# Patient Record
Sex: FEMALE | Race: WHITE
[De-identification: names, ages, dates, MRNs, and addresses within clinical notes are randomized per-mention and may not be internally consistent; named-entity substitution may affect disease eponyms.]

---

## 2018-12-31 ENCOUNTER — HOSPITAL ENCOUNTER (OUTPATIENT)
Dept: HOSPITAL 97 - ER | Age: 73
Setting detail: OBSERVATION
LOS: 3 days | Discharge: HOME | End: 2019-01-03
Attending: HOSPITALIST | Admitting: INTERNAL MEDICINE
Payer: COMMERCIAL

## 2018-12-31 VITALS — BODY MASS INDEX: 27.4 KG/M2

## 2018-12-31 DIAGNOSIS — R77.1: ICD-10-CM

## 2018-12-31 DIAGNOSIS — Z23: ICD-10-CM

## 2018-12-31 DIAGNOSIS — J02.0: ICD-10-CM

## 2018-12-31 DIAGNOSIS — J13: Primary | ICD-10-CM

## 2018-12-31 LAB
BUN BLD-MCNC: 12 MG/DL (ref 7–18)
GLUCOSE SERPLBLD-MCNC: 113 MG/DL (ref 74–106)
HCT VFR BLD CALC: 37.6 % (ref 36–45)
LYMPHOCYTES # SPEC AUTO: 2.3 K/UL (ref 0.7–4.9)
NT-PROBNP SERPL-MCNC: 95 PG/ML (ref ?–125)
PMV BLD: 8 FL (ref 7.6–11.3)
POTASSIUM SERPL-SCNC: 3.1 MMOL/L (ref 3.5–5.1)
RBC # BLD: 4.4 M/UL (ref 3.86–4.86)

## 2018-12-31 PROCEDURE — 84165 PROTEIN E-PHORESIS SERUM: CPT

## 2018-12-31 PROCEDURE — 83605 ASSAY OF LACTIC ACID: CPT

## 2018-12-31 PROCEDURE — 80061 LIPID PANEL: CPT

## 2018-12-31 PROCEDURE — 87804 INFLUENZA ASSAY W/OPTIC: CPT

## 2018-12-31 PROCEDURE — 96365 THER/PROPH/DIAG IV INF INIT: CPT

## 2018-12-31 PROCEDURE — 81015 MICROSCOPIC EXAM OF URINE: CPT

## 2018-12-31 PROCEDURE — 84100 ASSAY OF PHOSPHORUS: CPT

## 2018-12-31 PROCEDURE — 90670 PCV13 VACCINE IM: CPT

## 2018-12-31 PROCEDURE — 80048 BASIC METABOLIC PNL TOTAL CA: CPT

## 2018-12-31 PROCEDURE — 85025 COMPLETE CBC W/AUTO DIFF WBC: CPT

## 2018-12-31 PROCEDURE — 87040 BLOOD CULTURE FOR BACTERIA: CPT

## 2018-12-31 PROCEDURE — 36415 COLL VENOUS BLD VENIPUNCTURE: CPT

## 2018-12-31 PROCEDURE — 87081 CULTURE SCREEN ONLY: CPT

## 2018-12-31 PROCEDURE — 81003 URINALYSIS AUTO W/O SCOPE: CPT

## 2018-12-31 PROCEDURE — 83880 ASSAY OF NATRIURETIC PEPTIDE: CPT

## 2018-12-31 PROCEDURE — 83735 ASSAY OF MAGNESIUM: CPT

## 2018-12-31 PROCEDURE — 80074 ACUTE HEPATITIS PANEL: CPT

## 2018-12-31 PROCEDURE — 84132 ASSAY OF SERUM POTASSIUM: CPT

## 2018-12-31 PROCEDURE — 99285 EMERGENCY DEPT VISIT HI MDM: CPT

## 2018-12-31 PROCEDURE — 71046 X-RAY EXAM CHEST 2 VIEWS: CPT

## 2018-12-31 PROCEDURE — 94760 N-INVAS EAR/PLS OXIMETRY 1: CPT

## 2018-12-31 PROCEDURE — 80053 COMPREHEN METABOLIC PANEL: CPT

## 2018-12-31 PROCEDURE — 94640 AIRWAY INHALATION TREATMENT: CPT

## 2018-12-31 RX ADMIN — PIPERACILLIN SODIUM AND TAZOBACTAM SODIUM SCH MLS: 3; .375 INJECTION, POWDER, LYOPHILIZED, FOR SOLUTION INTRAVENOUS at 23:14

## 2018-12-31 RX ADMIN — Medication SCH ML: at 23:12

## 2018-12-31 RX ADMIN — SODIUM CHLORIDE SCH MLS: 0.9 INJECTION, SOLUTION INTRAVENOUS at 23:12

## 2018-12-31 NOTE — ER
Nurse's Notes                                                                                     

 [facility]                                                                

Name: Shaan Byrnes                                                                               

Age: 73 yrs                                                                                       

Sex: Female                                                                                       

: 1945                                                                                   

MRN: [id number]                                                                                   

Arrival Date: 2018                                                                          

Time: 18:44                                                                                       

Account#: [id number]                                                                            

Bed 16                                                                                            

Private MD: Sanjay Gomez V                                                                      

Diagnosis: Lobar pneumonia, unspecified organism                                                  

                                                                                                  

Presentation:                                                                                     

                                                                                             

18:49 Presenting complaint: Patient states: cough, core throat started a day after [place], [place]  

      my ears hurting, took OTC meds PTA: reports fever;. Transition of care: patient was not     

      received from another setting of care. Onset of symptoms was 2018. Risk        

      Assessment: Do you want to hurt yourself or someone else? Patient reports no desire to      

      harm self or others. Initial Sepsis Screen: Does the patient meet any 2 criteria? Yes       

      Does the patient have a suspected source of infection? No. Patient's initial sepsis         

      screen is negative. Care prior to arrival: None.                                            

18:49 Method Of Arrival: Ambulatory                                                             

18:49 Acuity: CURTIS 4                                                                           hj  

                                                                                                  

Triage Assessment:                                                                                

18:50 General: Appears in no apparent distress. uncomfortable, Behavior is calm, cooperative, hj  

      appropriate for age. Pain: Complains of pain in throat.                                     

                                                                                                  

Historical:                                                                                       

- Allergies:                                                                                      

18:50 No Known Allergies;                                                                     hj  

- Home Meds:                                                                                      

18:50 HRT [Active]; unknown antidepressant [Active];                                          hj  

- PMHx:                                                                                           

18:50 Depression;                                                                             hj  

- PSHx:                                                                                           

18:50 Hysterectomy; Cholecystectomy;                                                          hj  

                                                                                                  

- Immunization history:: Adult Immunizations up to date.                                          

- Social history:: Smoking status: Patient/guardian denies using tobacco,                         

  Patient/guardian denies using alcohol.                                                          

- Ebola Screening: : Patient negative for fever greater than or equal to 101.5 degrees            

  Fahrenheit, and additional compatible Ebola Virus Disease symptoms Patient denies               

  exposure to infectious person Patient denies travel to an Ebola-affected area in the            

  21 days before illness onset.                                                                   

                                                                                                  

                                                                                                  

Screenin:50 Abuse screen: Denies threats or abuse. Denies injuries from another. Nutritional        hj  

      screening: No deficits noted. Tuberculosis screening: No symptoms or risk factors           

      identified. Fall Risk None identified.                                                      

                                                                                                  

Assessment:                                                                                       

18:51 Respiratory: Airway is patent Respiratory effort is even, unlabored, Respiratory        hj  

      pattern is regular, symmetrical, EENT: Throat.                                              

19:20 Reassessment: Patient appears in no apparent distress at this time. Patient and/or      cc3 

      family updated on plan of care and expected duration. Pain level reassessed. Patient is     

      alert, oriented x 3, equal unlabored respirations, skin warm/dry/pink.                      

20:26 Reassessment: Patient appears in no apparent distress at this time. Patient and/or      cc3 

      family updated on plan of care and expected duration. Pain level reassessed. Patient is     

      alert, oriented x 3, equal unlabored respirations, skin warm/dry/pink. Laboratory           

      called for critical lab result of WBC 21.3, informed Dr. Paul.                             

21:36 Reassessment: Patient appears in no apparent distress at this time. Patient and/or      cc3 

      family updated on plan of care and expected duration. Pain level reassessed. Patient is     

      alert, oriented x 3, equal unlabored respirations, skin warm/dry/pink.                      

21:50 Reassessment: Patient appears in no apparent distress at this time. Patient and/or      cc3 

      family updated on plan of care and expected duration. Pain level reassessed. Patient is     

      alert, oriented x 3, equal unlabored respirations, skin warm/dry/pink. Room available       

      in 214, called for report but was told by charge nurse Tiarra to wait for their call.          

22:10 Reassessment: Patient appears in no apparent distress at this time. Patient and/or      cc3 

      family updated on plan of care and expected duration. Pain level reassessed. Patient is     

      alert, oriented x 3, equal unlabored respirations, skin warm/dry/pink. report called to     

      TANGELA Ibarra for continuity of care.                                                           

22:54 Reassessment: Patient appears in no apparent distress at this time. Patient and/or      cc3 

      family updated on plan of care and expected duration. Pain level reassessed. Patient is     

      alert, oriented x 3, equal unlabored respirations, skin warm/dry/pink. Patient left ER      

      for admission vitally stable by wheelchair escorted by KESHAV Matos.                       

                                                                                                  

Vital Signs:                                                                                      

18:51  / 56; Pulse 97; Resp 20; Temp 100.7(O); Pulse Ox 96% on R/A; Weight 68.04 kg;    hj  

      Height 5 ft. 2 in. (157.48 cm); Pain 4/10;                                                  

20:40  / 81; Pulse 93; Resp 20 S; Pulse Ox 96% on R/A;                                  cc3 

21:27  / 70; Pulse 81; Resp 19 S; Temp 99.3(O); Pulse Ox 96% on R/A;                    cc3 

22:44  / 67; Pulse 88; Resp 20 S; Pulse Ox 95% on R/A;                                  cc3 

18:51 Body Mass Index 27.44 (68.04 kg, 157.48 cm)                                               

                                                                                                  

ED Course:                                                                                        

18:44 Patient arrived in ED.                                                                  mr  

18:45 Sanjay Gomez MD is Private Physician.                                                 mr  

18:50 Triage completed.                                                                       hj  

18:51 Arm band placed on left wrist.                                                          hj  

18:51 Patient has correct armband on for positive identification. Bed in low position. Call   hj  

      light in reach. Side rails up X 1.                                                          

19:20 Owen Paul MD is Attending Physician.                                              gs  

19:20 Taylor Long is Primary Nurse.                                                      cc3 

19:47 Patient moved to radiology via wheelchair.                                              az  

19:50 X-ray completed. Patient tolerated procedure well.                                      az  

19:54 Patient moved back from radiology.                                                      az  

19:55 XRAY Chest Pa And Lat (2 Views) In Process Unspecified.                                 EDMS

20:15 Inserted saline lock: 22 gauge in left antecubital area, using aseptic technique. Blood cc3 

      collected.                                                                                  

20:15 Initial lab(s) drawn, by me, sent to lab.                                               cc3 

21:08 Ravindra Solis MD is Hospitalizing Provider.                                           gs  

22:54 No provider procedures requiring assistance completed. Patient admitted, IV remains in  cc3 

      place.                                                                                      

                                                                                                  

Administered Medications:                                                                         

20:45 Drug: Rocephin - (cefTRIAXone) 1 grams Route: IVPB; Infused Over: 30 mins; Site: left   cc3 

      antecubital;                                                                                

21:20 Follow up: Response: No adverse reaction; IV Status: Completed infusion; IV Intake: [id number] 

20:45 Drug: Zithromax 500 mg Route: PO;                                                       cc3 

21:20 Follow up: Response: No adverse reaction                                                cc3 

                                                                                                  

                                                                                                  

Intake:                                                                                           

21:20 IV: 50ml; Total: 50ml.                                                                  cc3 

                                                                                                  

Outcome:                                                                                          

21:10 Decision to Hospitalize by Provider.                                                    gs  

22:10 Admitted to Med/surg accompanied by tech, via wheelchair, room 214, with chart, Report  cc3 

      called to  DOMINIC Ibarra                                                                     

22:10 Condition: stable                                                                           

22:10 Instructed on the need for admit, Demonstrated understanding of instructions.               

22:54 Patient left the ED.                                                                    cc3 

                                                                                                  

Signatures:                                                                                       

Dispatcher [employer]                           Anastacia SandersRey RN                      RN                                                      

Owen Paul MD MD gs Cordel, Charlene                             cc3                                                  

Laila Franklin Gabriella                                                                                 

                                                                                                  

Corrections: (The following items were deleted from the chart)                                    

18:54 18:51 Pulse 97bpm; Resp 20bpm; Pulse Ox 96% RA; Temp 100.7F Oral; 68.04 kg; Height 5    hj  

      ft. 2 in.; BMI: 27.4; Pain 4/10; hj                                                         

18:54 18:51 Pulse 97bpm; Resp 20bpm; Pulse Ox 96% RA; Temp 100.7F Oral; 68.04 kg; Height 5    hj  

      ft. 2 in.; BMI: 27.4; Pain 4/10; hj                                                         

22:25 21:50 Reassessment: Patient appears in no apparent distress at this time. Patient       cc3 

      and/or family updated on plan of care and expected duration. Pain level reassessed.         

      Patient is alert, oriented x 3, equal unlabored respirations, skin warm/dry/pink. Room      

      available in 214, called for report but was told cc3                                        

22:52 22:10 Reassessment: Patient appears in no apparent distress at this time. Patient       cc3 

      and/or family updated on plan of care and expected duration. Pain level reassessed.         

      Patient is alert, oriented x 3, equal unlabored respirations, skin warm/dry/pink.           

      report to TANGELA Ibarra 3                                                                     

                                                                                             

00:01  21:05 Inserted saline lock: 22 gauge in left antecubital area, using aseptic      cc3 

      technique. Blood collected.                                                               

                                                                                             

00: 21:05 Initial lab(s) drawn, by me, sent to lab.                                 cc3 

                                                                                                  

**************************************************************************************************

## 2018-12-31 NOTE — EDPHYS
Physician Documentation                                                                           

 [facility]                                                                

Name: Shaan Byrnes                                                                               

Age: 73 yrs                                                                                       

Sex: Female                                                                                       

: 1945                                                                                   

MRN: [id number]                                                                                   

Arrival Date: 2018                                                                          

Time: 18:44                                                                                       

Account#: [id number]                                                                            

Bed 16                                                                                            

Private MD: Sanjay Gomez V                                                                      

ED Physician Owen aPul                                                                       

HPI:                                                                                              

                                                                                             

20:54 This 73 yrs old  Female presents to ER via Ambulatory with complaints of       gs  

      Cough, Sore Throat, Decreased Appetite.                                                     

20:54 The patient or guardian reports cough.                                                  gs  

21:00 Onset: The symptoms/episode began/occurred 1 week(s) ago, and became worse and became   gs  

      persistent. Severity of symptoms: At their worst the symptoms were moderate, in the         

      emergency department the symptoms are unchanged. Modifying factors: The symptoms are        

      alleviated by nothing, the symptoms are aggravated by nothing. Associated signs and         

      symptoms: Pertinent positives: fever, sore throat, laryngitis. The patient has              

      experienced similar episodes in the past, a few times. The patient has not recently         

      seen a physician.                                                                           

                                                                                                  

Historical:                                                                                       

- Allergies:                                                                                      

18:50 No Known Allergies;                                                                     hj  

- Home Meds:                                                                                      

18:50 HRT [Active]; unknown antidepressant [Active];                                          hj  

- PMHx:                                                                                           

18:50 Depression;                                                                             hj  

- PSHx:                                                                                           

18:50 Hysterectomy; Cholecystectomy;                                                          hj  

                                                                                                  

- Immunization history:: Adult Immunizations up to date.                                          

- Social history:: Smoking status: Patient/guardian denies using tobacco,                         

  Patient/guardian denies using alcohol.                                                          

- Ebola Screening: : Patient negative for fever greater than or equal to 101.5 degrees            

  Fahrenheit, and additional compatible Ebola Virus Disease symptoms Patient denies               

  exposure to infectious person Patient denies travel to an Ebola-affected area in the            

  21 days before illness onset.                                                                   

                                                                                                  

                                                                                                  

ROS:                                                                                              

21:00 All other systems are negative.                                                         gs  

                                                                                                  

Exam:                                                                                             

21:00 Head/Face:  Normocephalic, atraumatic. Eyes:  Pupils equal round and reactive to light, gs  

      extra-ocular motions intact.  Lids and lashes normal.  Conjunctiva and sclera are           

      non-icteric and not injected.  Cornea within normal limits.  Periorbital areas with no      

      swelling, redness, or edema. Neck:  Trachea midline, no thyromegaly or masses palpated,     

      and no cervical lymphadenopathy.  Supple, full range of motion without nuchal rigidity,     

      or vertebral point tenderness.  No Meningismus. Chest/axilla:  Normal chest wall            

      appearance and motion.  Nontender with no deformity.  No lesions are appreciated.           

      Cardiovascular:  Regular rate and rhythm with a normal S1 and S2.  No gallops, murmurs,     

      or rubs.  Normal PMI, no JVD.  No pulse deficits. Abdomen/GI:  Soft, non-tender, with       

      normal bowel sounds.  No distension or tympany.  No guarding or rebound.  No evidence       

      of tenderness throughout. Back:  No spinal tenderness.  No costovertebral tenderness.       

      Full range of motion. Skin:  Warm, dry with normal turgor.  Normal color with no            

      rashes, no lesions, and no evidence of cellulitis. MS/ Extremity:  Pulses equal, no         

      cyanosis.  Neurovascular intact.  Full, normal range of motion. Neuro:  Awake and           

      alert, GCS 15, oriented to person, place, time, and situation.  Cranial nerves II-XII       

      grossly intact.  Motor strength 5/5 in all extremities.  Sensory grossly intact.            

      Cerebellar exam normal.  Normal gait.                                                       

21:00 Constitutional: The patient appears alert, awake.                                           

21:00 ENT: TM's: are normal, Posterior pharynx: erythema, that is moderate, Voice: is hoarse.     

21:00 Respiratory: mild respiratory distress is noted,  Respirations: tachypnea, that is          

      mild, Breath sounds: rhonchi, that are mild, are located in both bases.                     

                                                                                                  

Vital Signs:                                                                                      

18:51  / 56; Pulse 97; Resp 20; Temp 100.7(O); Pulse Ox 96% on R/A; Weight 68.04 kg;      

      Height 5 ft. 2 in. (157.48 cm); Pain 4/10;                                                  

20:40  / 81; Pulse 93; Resp 20 S; Pulse Ox 96% on R/A;                                  cc3 

21:27  / 70; Pulse 81; Resp 19 S; Temp 99.3(O); Pulse Ox 96% on R/A;                    cc3 

22:44  / 67; Pulse 88; Resp 20 S; Pulse Ox 95% on R/A;                                  cc3 

18:51 Body Mass Index 27.44 (68.04 kg, 157.48 cm)                                               

                                                                                                  

MDM:                                                                                              

19:32 Patient medically screened.                                                               

21:00 Differential Diagnosis: Bronchitis Influenza Viral Syndrome Pneumonia. Data reviewed:     

      vital signs, nurses notes. Response to treatment: the patient's symptoms have mildly        

      improved after treatment, and as a result, I will discharge patient.                        

                                                                                                  

                                                                                             

18:53 Order name: Flu; Complete Time: 19:32                                                   hj  

                                                                                             

18:53 Order name: Strep; Complete Time: 19:32                                                 hj  

                                                                                             

19:36 Order name: CBC with Diff; Complete Time: 20:39                                           

                                                                                             

19:36 Order name: Basic Metabolic Panel                                                         

                                                                                             

20:33 Order name: Blood Culture*                                                                

                                                                                             

20:49 Order name: CBC with Automated Diff                                                     EDMS

                                                                                             

20:49 Order name: CBC with Automated Diff                                                     EDMS

                                                                                             

20:49 Order name: Comprehensive Metabolic Panel                                               EDMS

                                                                                             

20:49 Order name: Comprehensive Metabolic Panel                                               EDMS

                                                                                             

20:49 Order name: Lactate                                                                     EDMS

                                                                                             

20:49 Order name: Lactate                                                                     EDMS

                                                                                             

20:49 Order name: Lipid Profile                                                               EDMS

                                                                                             

20:49 Order name: Lipid Profile                                                               EDMS

                                                                                             

20:49 Order name: Magnesium                                                                   EDMS

                                                                                             

19:36 Order name: XRAY Chest Pa And Lat (2 Views); Complete Time: 20:16                         

                                                                                             

20:49 Order name: Regular                                                                     EDMS

                                                                                             

20:49 Order name: Magnesium                                                                   EDMS

                                                                                             

20:49 Order name: Phosphorus                                                                  EDMS

                                                                                             

20:49 Order name: Phosphorus                                                                  EDMS

                                                                                             

20:49 Order name: NT PRO-BNP                                                                  EDMS

                                                                                             

20:49 Order name: NT PRO-BNP                                                                  EDMS

                                                                                             

21:49 Order name: Lactate                                                                     EDMS

                                                                                             

21:59 Order name: NT PRO-BNP                                                                  EDMS

                                                                                                  

Administered Medications:                                                                         

20:45 Drug: Rocephin - (cefTRIAXone) 1 grams Route: IVPB; Infused Over: 30 mins; Site: left   cc3 

      antecubital;                                                                                

21:20 Follow up: Response: No adverse reaction; IV Status: Completed infusion; IV Intake: [id number] 

20:45 Drug: Zithromax 500 mg Route: PO;                                                       cc3 

21:20 Follow up: Response: No adverse reaction                                                cc3 

                                                                                                  

                                                                                                  

Disposition:                                                                                      

18 21:10 Hospitalization ordered by Ravindra Solis for Inpatient Admission. Preliminary     

  diagnosis is Lobar pneumonia, unspecified organism.                                             

- Bed requested for Telemetry/MedSurg (Inpatient).                                                

- Status is Inpatient Admission.                                                              cc3 

- Condition is Stable.                                                                            

- Problem is new.                                                                                 

- Symptoms have improved.                                                                         

UTI on Admission? No                                                                              

                                                                                                  

                                                                                                  

                                                                                                  

Critical care time excluding procedures:                                                          

21:00 Critical care time: Bedside Care: 10 minutes, Consultation: 10 minutes, Family          gs  

      Intervention: 10 minutes. Total time: 30 minutes                                            

                                                                                                  

Signatures:                                                                                       

Dispatcher [employer]                           EDMS                                                 

Shivani Cole RN RN                                                      

Rey Aragon RN RN                                                      

Owen Paul MD MD                                                      

Taylor Long                             3                                                  

                                                                                                  

Corrections: (The following items were deleted from the chart)                                    

21:44 21:10 Hospitalization Ordered by Ravindra Solis MD for Inpatient Admission. Preliminary    

      diagnosis is Lobar pneumonia, unspecified organism. Bed requested for Telemetry/MedSurg     

      (Inpatient). Status is Inpatient Admission. Condition is Stable. Problem is new.            

      Symptoms have improved. UTI on Admission? No.                                             

22:54 21:44 2018 21:10 Hospitalization Ordered by Ravindra Solis MD for Inpatient        cc3 

      Admission. Preliminary diagnosis is Lobar pneumonia, unspecified organism. Bed              

      requested for Telemetry/MedSurg (Inpatient). Status is Inpatient Admission. Condition       

      is Stable. Problem is new. Symptoms have improved. UTI on Admission? No.                  

                                                                                                  

**************************************************************************************************

## 2018-12-31 NOTE — RAD REPORT
EXAM DESCRIPTION:  RAD - Chest Pa And Lat (2 Views) - 12/31/2018 7:55 pm

 

CLINICAL HISTORY:  Cough, sore throat

 

COMPARISON:  May 2017

 

TECHNIQUE:  PA and lateral views of the chest were obtained.

 

FINDINGS:  The lungs are normal volume. Patient has a mild baseline prominence of the lung markings. 
Focal posterior left base opacification is present suspicious for early pneumonia.   Heart size is no
rmal and central vasculature is within normal limits.  No pleural effusion or pneumothorax seen.  No 
acute bony finding noted.  No aortic abnormality.

 

IMPRESSION:  Early left lung base pneumonia.

## 2019-01-01 LAB
ALBUMIN SERPL BCP-MCNC: 2.2 G/DL (ref 3.4–5)
ALP SERPL-CCNC: 127 U/L (ref 45–117)
ALT SERPL W P-5'-P-CCNC: 51 U/L (ref 12–78)
AST SERPL W P-5'-P-CCNC: 11 U/L (ref 15–37)
BUN BLD-MCNC: 12 MG/DL (ref 7–18)
GLUCOSE SERPLBLD-MCNC: 104 MG/DL (ref 74–106)
HCT VFR BLD CALC: 32.9 % (ref 36–45)
HDLC SERPL-MCNC: 34 MG/DL (ref 40–60)
LDLC SERPL CALC-MCNC: 85 MG/DL (ref ?–130)
LYMPHOCYTES # SPEC AUTO: 3.7 K/UL (ref 0.7–4.9)
MAGNESIUM SERPL-MCNC: 1.9 MG/DL (ref 1.8–2.4)
NT-PROBNP SERPL-MCNC: 77 PG/ML (ref ?–125)
PMV BLD: 7.9 FL (ref 7.6–11.3)
POTASSIUM SERPL-SCNC: 3.5 MMOL/L (ref 3.5–5.1)
RBC # BLD: 3.86 M/UL (ref 3.86–4.86)
UA COMPLETE W REFLEX CULTURE PNL UR: (no result)
UA DIPSTICK W REFLEX MICRO PNL UR: (no result)

## 2019-01-01 RX ADMIN — SODIUM CHLORIDE SCH MLS: 0.9 INJECTION, SOLUTION INTRAVENOUS at 23:02

## 2019-01-01 RX ADMIN — ACETAMINOPHEN PRN MG: 500 TABLET, FILM COATED ORAL at 05:25

## 2019-01-01 RX ADMIN — IPRATROPIUM BROMIDE SCH MG: 0.5 SOLUTION RESPIRATORY (INHALATION) at 07:32

## 2019-01-01 RX ADMIN — IPRATROPIUM BROMIDE SCH: 0.5 SOLUTION RESPIRATORY (INHALATION) at 14:00

## 2019-01-01 RX ADMIN — PIPERACILLIN SODIUM AND TAZOBACTAM SODIUM SCH MLS: 3; .375 INJECTION, POWDER, LYOPHILIZED, FOR SOLUTION INTRAVENOUS at 05:07

## 2019-01-01 RX ADMIN — IPRATROPIUM BROMIDE SCH MG: 0.5 SOLUTION RESPIRATORY (INHALATION) at 04:40

## 2019-01-01 RX ADMIN — Medication SCH ML: at 09:04

## 2019-01-01 RX ADMIN — ENOXAPARIN SODIUM SCH MG: 40 INJECTION SUBCUTANEOUS at 09:03

## 2019-01-01 RX ADMIN — IPRATROPIUM BROMIDE SCH MG: 0.5 SOLUTION RESPIRATORY (INHALATION) at 00:00

## 2019-01-01 RX ADMIN — LOSARTAN POTASSIUM SCH MG: 50 TABLET, FILM COATED ORAL at 12:26

## 2019-01-01 RX ADMIN — IPRATROPIUM BROMIDE SCH MG: 0.5 SOLUTION RESPIRATORY (INHALATION) at 19:25

## 2019-01-01 RX ADMIN — CEFTRIAXONE SCH MLS: 1 INJECTION, SOLUTION INTRAVENOUS at 21:07

## 2019-01-01 RX ADMIN — IPRATROPIUM BROMIDE SCH MG: 0.5 SOLUTION RESPIRATORY (INHALATION) at 11:08

## 2019-01-01 RX ADMIN — LOSARTAN POTASSIUM SCH MG: 50 TABLET, FILM COATED ORAL at 12:25

## 2019-01-01 RX ADMIN — Medication SCH: at 21:00

## 2019-01-01 RX ADMIN — SODIUM CHLORIDE SCH MLS: 0.9 INJECTION, SOLUTION INTRAVENOUS at 12:26

## 2019-01-01 NOTE — P.PN
Subjective


Date of Service: 01/01/19


Chief Complaint: SICK SINCE 26TH OF DECEMBER


Subjective: Improving





MS STEVAN HAS COUGH, CONGESTION, BROWN SPUTUM, SORE THROAT. SHE HAS STREP 

THROAT POSITIVE AND L BASAL PNEUMONIA.  SHE TOOK  HER FLU VACCINE.  NO ONE IS 

SICK AROUND HER.  SHE IS FEELING SOME BETTER.





Review of Systems


10-point ROS is otherwise unremarkable


General: Weakness


ENT: Throat Pain


Respiratory: Cough





Physical Examination





- Vital Signs


Temperature: 97.2 F


Blood Pressure: 150/80


Pulse: 76


Respirations: 20


Pulse Ox (%): 96





- Physical Exam


General: Alert, In no apparent distress


HEENT: Atraumatic, PERRLA, EOMI


Neck: Supple, JVD not distended


Respiratory: Clear to auscultation bilaterally, Normal air movement


Cardiovascular: Regular rate/rhythm, Normal S1 S2


Gastrointestinal: Normal bowel sounds, No tenderness


Musculoskeletal: No tenderness


Integumentary: No rashes


Neurological: Normal speech, Normal tone, Normal affect


Lymphatics: No axilla or inguinal lymphadenopathy





- Studies


Laboratory Data (last 24 hrs)





12/31/18 20:00: Sodium 135 L, Potassium 3.1 L, BUN 12, Creatinine 1.09, Glucose 

113 H


12/31/18 20:00: WBC 21.3 H*, Hgb 12.9, Hct 37.6, Plt Count 375





Microbiology Data (last 24 hrs): 








12/31/18 18:56   Nasopharnyx   Influenza Type A Antigen Screen - Final


12/31/18 18:56   Nasopharnyx   Influenza Type B Antigen Screen - Final


12/31/18 18:56   Throat   Group A Streptococcus Rapid Screen - Final





Medications List Reviewed: Yes





Assessment And Plan





- Current Problems (Diagnosis)


(1) Streptococcus pneumoniae infection


Current Visit: Yes   Status: Acute   


Plan: 


ROCEPHIN IV. 


WILL DC ZOSYN AS SHE DOES NOT NEED PSEUDOMONAS OR ANAERBIC COVERAGE.








(2) Acute streptococcal pharyngitis


Current Visit: Yes   Status: Acute   


Plan: 


DAILY LAB. 


WATCH WBC








(3) Hyperglobulinemia


Current Visit: Yes   Status: Acute   


Plan: 


THIS CAN BE FROM INFECTION.


I WILL CHECK SPEP AND HEP C ANTIBODY.

## 2019-01-01 NOTE — P.HP
Certification for Inpatient


Patient admitted to: Observation


With expected LOS: <2 Midnights


Patient will require the following post-hospital care: None


Practitioner: I am a practitioner with admitting privileges, knowledge of 

patient current condition, hospital course, and medical plan of care.


Services: Services provided to patient in accordance with Admission 

requirements found in Title 42 Section 412.3 of the Code of Federal Regulations





Patient History


Date of Service: 12/31/18


Reason for admission: Acute strep pharyngitis


History of Present Illness: 





Patient is a 73-year-old female came into the hospital with fever, cough and a 

sore throat.  This been going on for the last week but has gotten progressively 

worse over the last couple of days.  She is seen in the emergency room, and her 

workup revealed that she had strep pharyngitis.  Her white count was 

significantly elevated, and her chest x-ray revealed a focal posterior left 

base opacification suspicious for early pneumonia.  Decision was made to admit 

the patient for IV antibiotic therapy, and if we also wanted to monitor her 

symptomatically to make sure the chest x-ray findings or something that was 

going to be progressively getting worse.  She was put in droplet precautions 

but will go ahead and discontinue this.


Allergies





No Known Drug Allergies Allergy (Verified 12/31/18 23:27)


 none





Home Medications: 








Venlafaxine HCl [Effexor*] 75 mg PO DAILY 12/31/18 








- Past Medical/Surgical History


Has patient received pneumonia vaccine in the past: No


Diabetic: No


-: depression


-: hysterectomy


-: cholecystectomy





- Family History


  ** Mother


Medical History: Other (see notes)


Notes: stroke





  ** Father


Medical History: Heart disease





- Social History


Smoking Status: Never smoker


Alcohol use: No


CD- Drugs: No


Caffeine use: Yes


Place of Residence: Home





Review of Systems


10-point ROS is otherwise unremarkable





Physical Examination





- Vital Signs


Temperature: 97.2 F


Blood Pressure: 150/80


Pulse: 76


Respirations: 20


Pulse Ox (%): 96





- Physical Exam


General: Alert, In no apparent distress, Oriented x3


HEENT: Atraumatic, Other (Erythematous mucous membrane), EOMI, Sclerae 

nonicteric


Neck: Supple, 2+ carotid pulse no bruit, No LAD, Without JVD or thyroid 

abnormality


Respiratory: Clear to auscultation bilaterally, Normal air movement


Cardiovascular: Regular rate/rhythm, Normal S1 S2, No murmurs


Gastrointestinal: Normal bowel sounds, Soft and benign, Non-distended, No 

tenderness


Musculoskeletal: No clubbing, No swelling, No tenderness


Integumentary: No rashes


Neurological: Normal gait, Normal speech, Normal strength at 5/5 x4 extr, 

Normal tone, Sensation intact, Cranial nerves 3-12 intact, Normal affect


Lymphatics: No axilla or inguinal lymphadenopathy





- Studies


Laboratory Data (last 24 hrs)





12/31/18 20:00: Sodium 135 L, Potassium 3.1 L, BUN 12, Creatinine 1.09, Glucose 

113 H


12/31/18 20:00: WBC 21.3 H*, Hgb 12.9, Hct 37.6, Plt Count 375





Microbiology Data (last 24 hrs): 








12/31/18 18:56   Nasopharnyx   Influenza Type A Antigen Screen - Final


12/31/18 18:56   Nasopharnyx   Influenza Type B Antigen Screen - Final


12/31/18 18:56   Throat   Group A Streptococcus Rapid Screen - Final








Assessment & Plan





- Problems (Diagnosis)


(1) Acute streptococcal pharyngitis


Current Visit: Yes   Status: Acute   





(2) Left lower lobe pneumonia


Current Visit: Yes   Status: Acute   





(3) Leukocytosis


Current Visit: Yes   Status: Acute   





- Plan





1. Continue with IV antibiotics; may need to add an anti-inflammatory 

medication as well


2. Awaiting blood culture


3. Repeat chest x-ray if symptoms worsen


4. Continue with nebs as needed


5. O2 per protocol


6. Continue with gentle hydration


7. Repeat labs including CBC and renal function in a.m.


8. GI and DVT prophylaxis


Discharge Plan: Home


Plan to discharge in: 48 Hours





- Advance Directives


Does patient have a Living Will: No


Does patient have a Durable POA for Healthcare: No





- Code Status/Comfort Care


Code Status Assessed: Yes


Code Status: Full Code


Critical Care: No


Time Spent Managing PTS Care (In Minutes): 45

## 2019-01-02 LAB
BUN BLD-MCNC: 12 MG/DL (ref 7–18)
BUN BLD-MCNC: 13 MG/DL (ref 7–18)
GLUCOSE SERPLBLD-MCNC: 130 MG/DL (ref 74–106)
GLUCOSE SERPLBLD-MCNC: 97 MG/DL (ref 74–106)
HCT VFR BLD CALC: 32.4 % (ref 36–45)
LYMPHOCYTES # SPEC AUTO: 2.2 K/UL (ref 0.7–4.9)
MORPHOLOGY BLD-IMP: (no result)
PMV BLD: 7.6 FL (ref 7.6–11.3)
POTASSIUM SERPL-SCNC: 3.6 MMOL/L (ref 3.5–5.1)
POTASSIUM SERPL-SCNC: 4 MMOL/L (ref 3.5–5.1)
RBC # BLD: 3.74 M/UL (ref 3.86–4.86)
STOMATOCYTES BLD QL SMEAR: (no result)

## 2019-01-02 RX ADMIN — IPRATROPIUM BROMIDE SCH MG: 0.5 SOLUTION RESPIRATORY (INHALATION) at 13:50

## 2019-01-02 RX ADMIN — Medication SCH ML: at 20:33

## 2019-01-02 RX ADMIN — IPRATROPIUM BROMIDE SCH MG: 0.5 SOLUTION RESPIRATORY (INHALATION) at 19:24

## 2019-01-02 RX ADMIN — LOSARTAN POTASSIUM SCH MG: 50 TABLET, FILM COATED ORAL at 23:44

## 2019-01-02 RX ADMIN — Medication SCH ML: at 09:17

## 2019-01-02 RX ADMIN — ENOXAPARIN SODIUM SCH MG: 40 INJECTION SUBCUTANEOUS at 09:16

## 2019-01-02 RX ADMIN — IPRATROPIUM BROMIDE SCH MG: 0.5 SOLUTION RESPIRATORY (INHALATION) at 07:43

## 2019-01-02 RX ADMIN — SODIUM CHLORIDE SCH MLS: 0.9 INJECTION, SOLUTION INTRAVENOUS at 13:10

## 2019-01-02 RX ADMIN — IPRATROPIUM BROMIDE SCH MG: 0.5 SOLUTION RESPIRATORY (INHALATION) at 01:25

## 2019-01-02 RX ADMIN — LOSARTAN POTASSIUM SCH MG: 50 TABLET, FILM COATED ORAL at 10:08

## 2019-01-02 RX ADMIN — SODIUM CHLORIDE SCH MLS: 0.9 INJECTION, SOLUTION INTRAVENOUS at 22:29

## 2019-01-02 RX ADMIN — VENLAFAXINE SCH MG: 75 TABLET ORAL at 09:16

## 2019-01-02 RX ADMIN — ACETAMINOPHEN PRN MG: 500 TABLET, FILM COATED ORAL at 20:30

## 2019-01-02 RX ADMIN — CEFTRIAXONE SCH MLS: 1 INJECTION, SOLUTION INTRAVENOUS at 20:32

## 2019-01-02 NOTE — P.PN
Subjective


Date of Service: 01/02/19


Chief Complaint: SICK SINCE 26TH OF DECEMBER


Subjective: Improving





MS STEVAN HAS COUGH, CONGESTION, BROWN SPUTUM, SORE THROAT. SHE HAS STREP 

THROAT POSITIVE AND L BASAL PNEUMONIA.  SHE TOOK  HER FLU VACCINE.  NO ONE IS 

SICK AROUND HER.  SHE IS FEELING SOME BETTER.





VERY STABLE CLINICALLY.


STILL SOME SORENESS THROAT BUT CHEST CONGESTION IS GONE. 





Review of Systems


10-point ROS is otherwise unremarkable


General: Weakness, Malaise





Physical Examination





- Vital Signs


Temperature: 98.1 F


Blood Pressure: 154/72


Pulse: 74


Respirations: 14


Pulse Ox (%): 96





- Physical Exam


General: Alert, Acute distress, Mild distress


HEENT: Atraumatic, PERRLA, EOMI


Neck: Supple, JVD not distended


Respiratory: Clear to auscultation bilaterally, Normal air movement


Cardiovascular: Regular rate/rhythm, Normal S1 S2


Gastrointestinal: Normal bowel sounds, No tenderness


Musculoskeletal: No tenderness


Integumentary: No rashes


Neurological: Normal speech, Normal tone, Normal affect


Lymphatics: No axilla or inguinal lymphadenopathy





- Studies


Medications List Reviewed: Yes





Assessment And Plan





- Current Problems (Diagnosis)


(1) Streptococcus pneumoniae infection


Current Visit: Yes   Status: Acute   


Plan: 


ROCEPHIN IV. 


WILL DC ZOSYN AS SHE DOES NOT NEED PSEUDOMONAS OR ANAERBIC COVERAGE.





STABLE. 


DC IN AM POSSIBLE. 


CHECK LAB AGAIN.








(2) Acute streptococcal pharyngitis


Current Visit: Yes   Status: Acute   


Plan: 


DAILY LAB. 


WATCH WBC








(3) Hyperglobulinemia


Current Visit: Yes   Status: Acute   


Plan: 


THIS CAN BE FROM INFECTION.


I WILL CHECK SPEP AND HEP C ANTIBODY.

## 2019-01-03 VITALS — TEMPERATURE: 98.3 F | DIASTOLIC BLOOD PRESSURE: 88 MMHG | SYSTOLIC BLOOD PRESSURE: 198 MMHG

## 2019-01-03 VITALS — OXYGEN SATURATION: 96 %

## 2019-01-03 RX ADMIN — Medication SCH ML: at 09:25

## 2019-01-03 RX ADMIN — ACETAMINOPHEN PRN MG: 500 TABLET, FILM COATED ORAL at 09:24

## 2019-01-03 RX ADMIN — IPRATROPIUM BROMIDE SCH MG: 0.5 SOLUTION RESPIRATORY (INHALATION) at 13:50

## 2019-01-03 RX ADMIN — IPRATROPIUM BROMIDE SCH MG: 0.5 SOLUTION RESPIRATORY (INHALATION) at 07:55

## 2019-01-03 RX ADMIN — VENLAFAXINE SCH MG: 75 TABLET ORAL at 09:24

## 2019-01-03 RX ADMIN — ACETAMINOPHEN PRN MG: 500 TABLET, FILM COATED ORAL at 02:12

## 2019-01-03 RX ADMIN — IPRATROPIUM BROMIDE SCH MG: 0.5 SOLUTION RESPIRATORY (INHALATION) at 02:10

## 2019-01-03 RX ADMIN — ENOXAPARIN SODIUM SCH MG: 40 INJECTION SUBCUTANEOUS at 09:24

## 2019-01-03 NOTE — P.DS
Admission Date: 12/31/18


Discharge Date: 01/03/19


Disposition: ROUTINE DISCHARGE


Reason for Admission: SICK SINCE 26TH OF DECEMBER





- Problems


(1) Streptococcus pneumoniae infection


Current Visit: Yes   Status: Acute   





(2) Acute streptococcal pharyngitis


Current Visit: Yes   Status: Acute   





(3) Hyperglobulinemia


Current Visit: Yes   Status: Acute   


Hospital Course: 





Ms. Byrnes has had strep pneumonia.  She is well now, will continue abx for 10 

days. She never had bp issues.  will fu on BP. She will check at home. She is 

given Losartan for home. 


Vital Signs/Physical Exam: 














Temp Pulse Resp BP Pulse Ox


 


 97.6 F   70   14   183/88 H  97 


 


 01/03/19 08:00  01/03/19 08:00  01/03/19 08:00  01/03/19 08:00  01/03/19 08:00








Laboratory Data at Discharge: 














WBC  11.0 K/uL (4.3-10.9)  H D 01/02/19  17:40    


 


Hgb  11.0 g/dL (12.0-15.0)  L  01/02/19  17:40    


 


Hct  32.4 % (36.0-45.0)  L  01/02/19  17:40    


 


Plt Count  386 K/uL (152-406)   01/02/19  17:40    


 


Sodium  140 mmol/L (136-145)   01/02/19  17:40    


 


Potassium  4.7 mmol/L (3.5-5.1)   01/03/19  05:32    


 


BUN  12 mg/dL (7-18)   01/02/19  17:40    


 


Creatinine  0.84 mg/dL (0.55-1.3)   01/02/19  17:40    


 


Glucose  130 mg/dL ()  H  01/02/19  17:40    


 


Phosphorus  2.9 mg/dL (2.5-4.9)   01/02/19  05:27    


 


Magnesium  1.9 mg/dL (1.8-2.4)   01/01/19  05:15    


 


Total Bilirubin  0.3 mg/dL (0.2-1.0)   01/01/19  05:15    


 


AST  11 U/L (15-37)  L  01/01/19  05:15    


 


ALT  51 U/L (12-78)   01/01/19  05:15    


 


Alkaline Phosphatase  127 U/L ()  H  01/01/19  05:15    


 


Triglycerides  105 mg/dL (<150)   01/01/19  05:15    


 


Cholesterol  140 mg/dL (<200)   01/01/19  05:15    


 


HDL Cholesterol  34 mg/dL (40-60)  L  01/01/19  05:15    


 


Cholesterol/HDL Ratio  4.12   01/01/19  05:15    








Home Medications: 








Venlafaxine HCl [Effexor*] 75 mg PO DAILY 12/31/18 


Amox/Clavulanate [Augmentin 875-125 Tab] 875 mg PO BID #24 tab 01/03/19 


Losartan Potassium 100 mg PO DAILY #90 tablet 01/03/19 





New Medications: 


Amox/Clavulanate [Augmentin 875-125 Tab] 875 mg PO BID #24 tab


Losartan Potassium 100 mg PO DAILY #90 tablet


Diet: AHA

## 2019-01-08 LAB
ALBUMIN SERPL ELPH-MCNC: 2.7 G/DL (ref 3.8–4.8)
PROT PATTERN SERPL ELPH-IMP: (no result)

## 2019-07-26 ENCOUNTER — HOSPITAL ENCOUNTER (EMERGENCY)
Dept: HOSPITAL 97 - ER | Age: 74
Discharge: HOME | End: 2019-07-26
Payer: COMMERCIAL

## 2019-07-26 VITALS — OXYGEN SATURATION: 100 % | DIASTOLIC BLOOD PRESSURE: 72 MMHG | TEMPERATURE: 98.1 F | SYSTOLIC BLOOD PRESSURE: 186 MMHG

## 2019-07-26 DIAGNOSIS — E78.5: ICD-10-CM

## 2019-07-26 DIAGNOSIS — Z85.828: ICD-10-CM

## 2019-07-26 DIAGNOSIS — L03.115: Primary | ICD-10-CM

## 2019-07-26 DIAGNOSIS — F32.9: ICD-10-CM

## 2019-07-26 DIAGNOSIS — I10: ICD-10-CM

## 2019-07-26 PROCEDURE — 99283 EMERGENCY DEPT VISIT LOW MDM: CPT

## 2019-07-26 NOTE — ER
Nurse's Notes                                                                                     

 [facility]                                                                 

Name: Shaan Byrnes                                                                               

Age: 73 yrs                                                                                       

Sex: Female                                                                                       

: 1945                                                                                   

MRN: [id number]                                                                                   

Arrival Date: 2019                                                                          

Time: 22:10                                                                                       

Account#: [id number]                                                                            

Bed 25                                                                                            

Private MD:                                                                                       

Diagnosis: Cellulitis of right lower limb                                                         

                                                                                                  

Presentation:                                                                                     

                                                                                             

22:14 Presenting complaint: Patient states: "Tuesday I had some skin cancer removed off of my aj1 

      leg, and this time it got a real bad rash, and itched real bad and got a blister around     

      the wound itself. I've been using gauze since then to cover it up, but the redness has      

      gone all the way around my leg now". Transition of care: patient was not received from      

      another setting of care. Onset of symptoms was 2019. Risk Assessment: Do you       

      want to hurt yourself or someone else? Patient reports no desire to harm self or            

      others. Initial Sepsis Screen: Does the patient meet any 2 criteria? No. Patient's          

      initial sepsis screen is negative. Does the patient have a suspected source of              

      infection? Yes: Skin breakdown/wound. Care prior to arrival: None.                          

22:14 Method Of Arrival: Ambulatory                                                           aj1 

22:14 Acuity: CURTIS 3                                                                           aj1 

                                                                                                  

Triage Assessment:                                                                                

22:17 General: Appears in no apparent distress. comfortable, Behavior is calm, cooperative,   aj1 

      appropriate for age. Pain: Denies pain. Neuro: Level of Consciousness is awake, alert,      

      obeys commands. Cardiovascular: Patient's skin is warm and dry. Respiratory: Airway is      

      patent Respiratory effort is even, unlabored, Respiratory pattern is regular,               

      symmetrical.                                                                                

                                                                                                  

Historical:                                                                                       

- Allergies:                                                                                      

22:17 No Known Allergies;                                                                     aj1 

- Home Meds:                                                                                      

22:17 blood pressure medicine [Active]; cholesterol medicine [Active]; unknown antidepressant aj1 

      [Active];                                                                                   

- PMHx:                                                                                           

22:17 Depression; Hypertension; Hyperlipidemia;                                               aj1 

- PSHx:                                                                                           

22:17 skin cancer removed;                                                                    aj1 

                                                                                                  

- Immunization history:: Flu vaccine is up to date.                                               

- Social history:: Smoking status: Patient/guardian denies using tobacco.                         

- Ebola Screening: : Patient denies travel to an Ebola-affected area in the 21 days               

  before illness onset.                                                                           

- Family history:: not pertinent.                                                                 

- Hospitalizations: : No recent hospitalization is reported.                                      

                                                                                                  

                                                                                                  

Screenin:20 Abuse screen: Denies threats or abuse. Denies injuries from another. Nutritional        ca1 

      screening: No deficits noted. Tuberculosis screening: No symptoms or risk factors           

      identified. Fall Risk None identified.                                                      

                                                                                                  

Assessment:                                                                                       

22:00 General: Appears in no apparent distress. comfortable, Behavior is calm, cooperative.   rv  

22:00 Pain: Denies pain. Neuro: Level of Consciousness is awake, alert, obeys commands,       rv  

      Oriented to person, place, time, situation. Cardiovascular: Patient's skin is warm and      

      dry. Respiratory: Airway is patent. GI: No signs and/or symptoms were reported              

      involving the gastrointestinal system. : No signs and/or symptoms were reported           

      regarding the genitourinary system. EENT: No signs and/or symptoms were reported            

      regarding the EENT system. Derm: Wound noted right leg Wound is post op, redness and        

      swelling around the site.                                                                   

                                                                                                  

Vital Signs:                                                                                      

22:17  / 72; Pulse 65; Resp 18; Temp 98.1; Pulse Ox 100% on R/A; Weight 68.49 kg (R);   aj1 

      Height 5 ft. 1 in. (154.94 cm) (R); Pain 0/10;                                              

22:17 Body Mass Index 28.53 (68.49 kg, 154.94 cm)                                             aj1 

                                                                                                  

ED Course:                                                                                        

22:10 Patient arrived in ED.                                                                  mr  

22:16 Triage completed.                                                                       aj1 

22:17 Arm band placed on Patient placed in an exam room.                                      aj1 

22:19 Jasbir Sue MD is Attending Physician.                                                rn  

22:20 Patient has correct armband on for positive identification. Bed in low position. Call   ca1 

      light in reach. Side rails up X 1. Pulse ox on. NIBP on. Warm blanket given.                

22:32 Justin Govea, DOMINIC is Primary Nurse.                                                  rv  

22:47 No provider procedures requiring assistance completed. Patient did not have IV access   ca1 

      during this emergency room visit.                                                           

                                                                                                  

Administered Medications:                                                                         

22:46 Drug: Bactrim (160 mg-800 mg (DS) 1 tablet Route: PO;                                   rv  

22:47 Follow up: Response: Medication administered at discharge.                              rv  

22:46 Drug: Clindamycin 300 mg Route: PO;                                                     rv  

22:46 Follow up: Response: Medication administered at discharge.                              rv  

                                                                                                  

                                                                                                  

Outcome:                                                                                          

22:31 Discharge ordered by MD.                                                                rn  

22:47 Discharged to home ambulatory.                                                          ca1 

22:47 Condition: stable                                                                           

22:47 Discharge instructions given to patient, Instructed on discharge instructions, follow       

      up and referral plans. medication usage, Demonstrated understanding of instructions,        

      follow-up care, medications, Prescriptions given X 2.                                       

22:49 Patient left the ED.                                                                    ca1 

                                                                                                  

Signatures:                                                                                       

Aure Clifton, RN                     RN   aj1                                                  

Anastacia Castorena                                 mr                                                   

Jasbir Sue MD MD rn Vicente, Ronaldo, RN RN                                                      

Mel Peña RN RN   ca1                                                  

                                                                                                  

**************************************************************************************************

## 2019-07-26 NOTE — EDPHYS
Physician Documentation                                                                           

 [facility]                                                                 

Name: Shaan Byrnes                                                                               

Age: 73 yrs                                                                                       

Sex: Female                                                                                       

: 1945                                                                                   

MRN: [id number]                                                                                   

Arrival Date: 2019                                                                          

Time: 22:10                                                                                       

Account#: [id number]                                                                            

Bed 25                                                                                            

Private MD:                                                                                       

ED Physician Jasbir Sue                                                                         

HPI:                                                                                              

                                                                                             

22:26 This 73 yrs old  Female presents to ER via Ambulatory with complaints of       rn  

      Infected surgical site.                                                                     

22:26 The patient presents with cellulitis of the right leg. Description: draining,           rn  

      erythematous. Onset: The symptoms/episode began/occurred 3 day(s) ago. Possible             

      cause(s): unknown. Modifying factors: the symptoms are alleviated by nothing, the           

      symptoms are aggravated by squeezing the lesion and expressing the contents, touching.      

      Severity of symptoms: At their worst the symptoms were mild, in the emergency               

      department the symptoms are unchanged. The patient has not experienced similar symptoms     

      in the past. 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             

      Tuesday, rash began next day, mild itching and pain, reports some drainage, no              

      swelling, not getting better, not able to get in with her dermatologist. NO fever. .        

                                                                                                  

Historical:                                                                                       

- Allergies:                                                                                      

22:17 No Known Allergies;                                                                     aj1 

- Home Meds:                                                                                      

22:17 blood pressure medicine [Active]; cholesterol medicine [Active]; unknown antidepressant aj1 

      [Active];                                                                                   

- PMHx:                                                                                           

22:17 Depression; Hypertension; Hyperlipidemia;                                               aj1 

- PSHx:                                                                                           

22:17 skin cancer removed;                                                                    aj1 

                                                                                                  

- Immunization history:: Flu vaccine is up to date.                                               

- Social history:: Smoking status: Patient/guardian denies using tobacco.                         

- Ebola Screening: : Patient denies travel to an Ebola-affected area in the 21 days               

  before illness onset.                                                                           

- Family history:: not pertinent.                                                                 

- Hospitalizations: : No recent hospitalization is reported.                                      

                                                                                                  

                                                                                                  

ROS:                                                                                              

22:26 Constitutional: Negative for fever, chills, and weight loss, Eyes: Negative for injury, rn  

      pain, redness, and discharge, Cardiovascular: Negative for chest pain, palpitations,        

      and edema, Respiratory: Negative for shortness of breath, cough, wheezing, and              

      pleuritic chest pain, Abdomen/GI: Negative for abdominal pain, nausea, vomiting,            

      diarrhea, and constipation, MS/Extremity: Negative for injury and deformity, Skin: +        

      rash and erythema to RLE Neuro: Negative for headache, weakness, numbness, tingling,        

      and seizure.                                                                                

                                                                                                  

Exam:                                                                                             

22:26 Constitutional:  This is a well developed, well nourished patient who is awake, alert,  rn  

      and in no acute distress.  Ambulatory to room without difficulty or assistance.  Skin:      

      Warm, dry, excisional surgical wound medial RLE with surrounding erythematous almost        

      petechial rash that spans a radius of 2cm from wound edge, non-blanching, no                

      fluctuance, clear drainage from wound. No streaking.  MS/ Extremity:  Pulses equal, no      

      cyanosis.  Neurovascular intact.  Full, normal range of motion.  Equal circumference.       

                                                                                                  

Vital Signs:                                                                                      

22:17  / 72; Pulse 65; Resp 18; Temp 98.1; Pulse Ox 100% on R/A; Weight 68.49 kg (R);   aj1 

      Height 5 ft. 1 in. (154.94 cm) (R); Pain 0/10;                                              

22:17 Body Mass Index 28.53 (68.49 kg, 154.94 cm)                                             aj1 

                                                                                                  

MDM:                                                                                              

22:20 Patient medically screened.                                                             rn  

22:26 Differential diagnosis: cellulitis. Data reviewed: vital signs, nurses notes, and as a  rn  

      result, I will discharge patient. Counseling: I had a detailed discussion with the          

      patient and/or guardian regarding: the historical points, exam findings, and any            

      diagnostic results supporting the discharge/admit diagnosis, the need for outpatient        

      follow up, to return to the emergency department if symptoms worsen or persist or if        

      there are any questions or concerns that arise at home. Special discussion: I discussed     

      with the patient/guardian in detail that at this point there is no indication for           

      admission to the hospital. It is understood, however, that if the symptoms persist or       

      worsen the patient needs to return immediately for re-evaluation. Based on the history      

      and exam findings, there is no indication for further emergent testing or inpatient         

      evaluation. I discussed with the patient/guardian the need to see the dermatologist for     

      further evaluation of the symptoms. ED course: Is planning on calling dermatologist         

      tomorrow for appt on Monday, return precautions given and understood..                      

                                                                                                  

Administered Medications:                                                                         

22:46 Drug: Bactrim (160 mg-800 mg (DS) 1 tablet Route: PO;                                   rv  

22:47 Follow up: Response: Medication administered at discharge.                              rv  

22:46 Drug: Clindamycin 300 mg Route: PO;                                                     rv  

22:46 Follow up: Response: Medication administered at discharge.                              rv  

                                                                                                  

                                                                                                  

Disposition:                                                                                      

19 22:31 Discharged to Home. Impression: Cellulitis of right lower limb.                    

- Condition is Stable.                                                                            

- Discharge Instructions: Cellulitis, Adult.                                                      

- Prescriptions for Clindamycin HCl 300 mg Oral Capsule - take 1 capsule by ORAL route            

  every 6 hours for 10 days; 40 capsule. Bactrim - 160 mg Oral Tablet - take 1              

  tablet by ORAL route every 12 hours for 10 days; 20 tablet.                                     

- Medication Reconciliation Form, Thank You Letter, Antibiotic Education, Prescription            

  Opioid Use form.                                                                                

- Follow up: Private Physician; When: As needed; Reason: Recheck today's complaints,              

  Re-evaluation by your physician.                                                                

- Problem is new.                                                                                 

- Symptoms are unchanged.                                                                         

                                                                                                  

                                                                                                  

                                                                                                  

Signatures:                                                                                       

Aure Clifton RN                     RN   aj1                                                  

Jasbir Sue MD MD rn Vicente, Ronaldo, RN                    RN   rv                                                   

Acob, Mel RN                        RN   ca1                                                  

                                                                                                  

Corrections: (The following items were deleted from the chart)                                    

22:49 22:31 2019 22:31 Discharged to Home. Impression: Cellulitis of right lower limb.  ca1 

      Condition is Stable. Forms are Medication Reconciliation Form, Thank You Letter,            

      Antibiotic Education, Prescription Opioid Use. Follow up: Private Physician; When: As       

      needed; Reason: Recheck today's complaints, Re-evaluation by your physician. Problem is     

      new. Symptoms are unchanged. rn                                                             

                                                                                                  

**************************************************************************************************